# Patient Record
Sex: FEMALE | Race: WHITE | Employment: UNEMPLOYED | URBAN - METROPOLITAN AREA
[De-identification: names, ages, dates, MRNs, and addresses within clinical notes are randomized per-mention and may not be internally consistent; named-entity substitution may affect disease eponyms.]

---

## 2018-09-25 ENCOUNTER — HOSPITAL ENCOUNTER (EMERGENCY)
Age: 28
Discharge: HOME | End: 2018-09-25
Attending: EMERGENCY MEDICINE
Payer: COMMERCIAL

## 2018-09-25 VITALS
HEIGHT: 65 IN | HEART RATE: 72 BPM | TEMPERATURE: 97.8 F | RESPIRATION RATE: 16 BRPM | OXYGEN SATURATION: 100 % | DIASTOLIC BLOOD PRESSURE: 81 MMHG | SYSTOLIC BLOOD PRESSURE: 128 MMHG | BODY MASS INDEX: 26.66 KG/M2 | WEIGHT: 160 LBS

## 2018-09-25 DIAGNOSIS — J39.2 THROAT IRRITATION: Primary | ICD-10-CM

## 2018-09-25 PROCEDURE — 99283 EMERGENCY DEPT VISIT LOW MDM: CPT

## 2018-09-25 NOTE — ED PROVIDER NOTES
[address] [place] [address] ED  eMERGENCY dEPARTMENT eNCOUnter      Pt Name: Terrence Christian  MRN: [id number]  [place] 1990  Date of evaluation: 9/25/2018  Provider: Michael Finney MD    CHIEF COMPLAINT       Chief Complaint   Patient presents with    Other     Throat Irritation         HISTORY OF PRESENT ILLNESS  (Location/Symptom, Timing/Onset, Context/Setting, Quality, Duration, Modifying Factors, Severity.)   Terrence Christian is a 32 y.o. female who presents to the emergency department For evaluation of throat irritation. Patient states she had some abdominal discomfort tonight. She took an antacid. After this antacid she developed throat irritation and has been repetitively clearing her throat since that time. Her aunt was concerned she might be having an allergic reaction. The aunt gave her liquid Benadryl. Patient continued to repetitively clear her throat. Patient had severe reflux during the course of her recent pregnancy. During that time she utilized the same antacid very frequently without any adverse response. She's had no prior history of any allergic responses or intolerances to this product or similar product. She has no facial lip or tongue swelling. No rash or lesion. No difficulty breathing speaking or swallowing. Nursing Notes were reviewed. ALLERGIES     Patient has no known allergies. CURRENT MEDICATIONS       Previous Medications    No medications on file       PAST MEDICAL HISTORY   No past medical history on file. SURGICAL HISTORY     No past surgical history on file. FAMILY HISTORY     No family history on file. No family status information on file. SOCIAL HISTORY      reports that she has been smoking. She has never used smokeless tobacco. She reports that she does not drink alcohol or use drugs. REVIEW OF SYSTEMS    (2-9 systems for level 4, 10 or more for level 5)   Review of Systems   All other systems reviewed and are negative.        Except as noted